# Patient Record
(demographics unavailable — no encounter records)

---

## 2024-10-14 NOTE — HISTORY OF PRESENT ILLNESS
[de-identified] : 29-year-old female presents with about a year of right-sided periscapular pain.  It goes to where the neck meets the upper back where it meets the shoulder blade.  Its on the right.  No pain rating down the arms or legs.  Normal wall.  No loss of bladder or bowel.  She has never done physical therapy for this.

## 2024-10-14 NOTE — IMAGING
[de-identified] : TTP midline cervical spine and paraspinal musculature   Strength                                                                     Deltoid   Right: 5/5; Left: 5/5                      Biceps   Right: 5/5; Left: 5/5                   Triceps        Right: 5/5; Left: 5/5                                 Wrist Extensors     Right: 5/5; Left: 5/5 Finger Flexors     Right: 5/5; Left: 5/5 IO    Right: 5/5; Left: 5/5  Sensation C5   Right: 2/2; Left: 2/2 C6   Right: 2/2; Left: 2/2 C7   Right: 2/2; Left: 2/2 C8   Right: 2/2; Left: 2/2 T1   Right: 2/2; Left: 2/2  Reflexes Biceps   Right: 2+; Left 2+ Triceps   Right: 2+; Left 2+ Shin's  Right: Negative; L: Negative

## 2024-10-14 NOTE — DISCUSSION/SUMMARY
[de-identified] : 29-year-old female with right scapular pain.  Physical therapy.  Follow-up as needed.

## 2024-10-14 NOTE — DATA REVIEWED
[FreeTextEntry1] : Obtained and reviewed AP lateral extension cervical spine x-rays no instability no fracture no dislocations.